# Patient Record
Sex: FEMALE | Race: WHITE | NOT HISPANIC OR LATINO
[De-identification: names, ages, dates, MRNs, and addresses within clinical notes are randomized per-mention and may not be internally consistent; named-entity substitution may affect disease eponyms.]

---
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
## 2020-08-26 ENCOUNTER — APPOINTMENT (OUTPATIENT)
Dept: PEDIATRICS | Facility: CLINIC | Age: 20
End: 2020-08-26

## 2021-01-04 ENCOUNTER — NON-APPOINTMENT (OUTPATIENT)
Age: 21
End: 2021-01-04

## 2021-01-04 ENCOUNTER — APPOINTMENT (OUTPATIENT)
Dept: PEDIATRICS | Facility: CLINIC | Age: 21
End: 2021-01-04
Payer: COMMERCIAL

## 2021-01-04 VITALS
HEIGHT: 68 IN | DIASTOLIC BLOOD PRESSURE: 70 MMHG | BODY MASS INDEX: 23.37 KG/M2 | WEIGHT: 154.2 LBS | HEART RATE: 86 BPM | SYSTOLIC BLOOD PRESSURE: 110 MMHG

## 2021-01-04 DIAGNOSIS — Z23 ENCOUNTER FOR IMMUNIZATION: ICD-10-CM

## 2021-01-04 DIAGNOSIS — F41.9 ANXIETY DISORDER, UNSPECIFIED: ICD-10-CM

## 2021-01-04 DIAGNOSIS — Z00.00 ENCOUNTER FOR GENERAL ADULT MEDICAL EXAMINATION W/OUT ABNORMAL FINDINGS: ICD-10-CM

## 2021-01-04 PROCEDURE — 99173 VISUAL ACUITY SCREEN: CPT | Mod: 59

## 2021-01-04 PROCEDURE — 90471 IMMUNIZATION ADMIN: CPT

## 2021-01-04 PROCEDURE — 96160 PT-FOCUSED HLTH RISK ASSMT: CPT | Mod: 59

## 2021-01-04 PROCEDURE — 99072 ADDL SUPL MATRL&STAF TM PHE: CPT

## 2021-01-04 PROCEDURE — 96127 BRIEF EMOTIONAL/BEHAV ASSMT: CPT

## 2021-01-04 PROCEDURE — 90686 IIV4 VACC NO PRSV 0.5 ML IM: CPT

## 2021-01-04 PROCEDURE — 92551 PURE TONE HEARING TEST AIR: CPT

## 2021-01-04 PROCEDURE — 99395 PREV VISIT EST AGE 18-39: CPT | Mod: 25

## 2021-01-04 RX ORDER — FLUOXETINE HYDROCHLORIDE 20 MG/1
20 CAPSULE ORAL
Refills: 0 | Status: ACTIVE | COMMUNITY

## 2021-01-04 NOTE — HISTORY OF PRESENT ILLNESS
[Up to date] : Up to date [Normal] : normal [Eats meals with family] : eats meals with family [Normal Performance] : normal performance [Eats regular meals including adequate fruits and vegetables] : eats regular meals including adequate fruits and vegetables [Has friends] : has friends [Screen time (except homework) less than 2 hours a day] : screen time (except homework) less than 2 hours a day [No] : No cigarette smoke exposure [Uses safety belts/safety equipment] : uses safety belts/safety equipment  [Has ways to cope with stress] : has ways to cope with stress [Irregular menses] : no irregular menses [Heavy Bleeding] : no heavy bleeding [Painful Cramps] : no painful cramps [Sleep Concerns] : no sleep concerns [Uses electronic nicotine delivery system] : does not use electronic nicotine delivery system [Exposure to electronic nicotine delivery system] : no exposure to electronic nicotine delivery system [Uses tobacco] : does not use tobacco [Exposure to tobacco] : no exposure to tobacco [Uses drugs] : does not use drugs  [Exposure to drugs] : no exposure to drugs [Drinks alcohol] : does not drink alcohol [Exposure to alcohol] : no exposure to alcohol [Yes] : Patient has had sexual intercourse. [HIV Screening Declined] : HIV Screening Declined [Gets depressed, anxious, or irritable/has mood swings] : does not get depressed, anxious, or irritable/has mood swings [de-identified] : Self [FreeTextEntry7] : 20 Year well visit [de-identified] : + condom use [FreeTextEntry1] : college sophomore, virtual \par anxiety- sees Dr Carvajal [id number], taking prozac 20mg daily, no longer in counselling, doing well\par Pt will see OBGYN today for routine care

## 2021-01-04 NOTE — DISCUSSION/SUMMARY
[] : The components of the vaccine(s) to be administered today are listed in the plan of care. The disease(s) for which the vaccine(s) are intended to prevent and the risks have been discussed with the caretaker.  The risks are also included in the appropriate vaccination information statements which have been provided to the patient's caregiver.  The caregiver has given consent to vaccinate. [FreeTextEntry1] : D/W pt well visit, reviewed nutrition/exercise, encourage safety- bike/ski helmet, seatbelt, sunblock, water safety; avoid alcohol/drug/tobacco use; reviewed condom use/chlamydia screens once sexually active; advise routine dental care; reviewed and consented for vaccinations today, advise lipid screen at 18yrs of age; reviewed menses and first Pap/Pelvic with OBGYN at 21yrs old.\par Reviewed PHQ9/Crafft- pt to continue current mental health plan. \par flu vaccine today; declined HPV and Men B vaccine\par

## 2021-01-04 NOTE — PHYSICAL EXAM

## 2025-01-29 ENCOUNTER — OFFICE (OUTPATIENT)
Dept: URBAN - METROPOLITAN AREA CLINIC 77 | Facility: CLINIC | Age: 25
Setting detail: OPHTHALMOLOGY
End: 2025-01-29
Payer: COMMERCIAL

## 2025-01-29 DIAGNOSIS — H02.402: ICD-10-CM

## 2025-01-29 DIAGNOSIS — H53.10: ICD-10-CM

## 2025-01-29 PROCEDURE — 99204 OFFICE O/P NEW MOD 45 MIN: CPT | Performed by: OPHTHALMOLOGY

## 2025-01-29 PROCEDURE — 92133 CPTRZD OPH DX IMG PST SGM ON: CPT | Performed by: OPHTHALMOLOGY

## 2025-01-29 PROCEDURE — 92083 EXTENDED VISUAL FIELD XM: CPT | Performed by: OPHTHALMOLOGY

## 2025-01-29 ASSESSMENT — REFRACTION_CURRENTRX
OS_SPHERE: -2.25
OS_VPRISM_DIRECTION: SV
OS_AXIS: 015
OD_OVR_VA: 20/
OS_OVR_VA: 20/
OD_SPHERE: -2.50
OD_CYLINDER: -0.50
OD_AXIS: 024
OD_VPRISM_DIRECTION: SV
OS_CYLINDER: -0.25

## 2025-01-29 ASSESSMENT — TONOMETRY
OS_IOP_MMHG: 20
OD_IOP_MMHG: 17

## 2025-01-29 ASSESSMENT — KERATOMETRY
OD_K2POWER_DIOPTERS: 42.75
OD_AXISANGLE_DEGREES: 102
OS_AXISANGLE_DEGREES: 083
OS_K1POWER_DIOPTERS: 41.50
OS_K2POWER_DIOPTERS: 43.00
OD_K1POWER_DIOPTERS: 41.50

## 2025-01-29 ASSESSMENT — REFRACTION_AUTOREFRACTION
OS_CYLINDER: -0.75
OS_SPHERE: -2.75
OD_AXIS: 027
OS_AXIS: 076
OD_SPHERE: -3.00
OD_CYLINDER: -0.75

## 2025-01-29 ASSESSMENT — CONFRONTATIONAL VISUAL FIELD TEST (CVF)
OS_FINDINGS: FULL
OD_FINDINGS: FULL

## 2025-01-29 ASSESSMENT — LID POSITION - PTOSIS: OS_PTOSIS: 2+ 3+

## 2025-01-29 ASSESSMENT — VISUAL ACUITY
OD_BCVA: 20/60
OS_BCVA: 20/20

## 2025-02-17 ENCOUNTER — OFFICE (OUTPATIENT)
Dept: URBAN - METROPOLITAN AREA CLINIC 77 | Facility: CLINIC | Age: 25
Setting detail: OPHTHALMOLOGY
End: 2025-02-17
Payer: COMMERCIAL

## 2025-02-17 DIAGNOSIS — H53.10: ICD-10-CM

## 2025-02-17 DIAGNOSIS — H57.02: ICD-10-CM

## 2025-02-17 DIAGNOSIS — H02.402: ICD-10-CM

## 2025-02-17 PROCEDURE — 99214 OFFICE O/P EST MOD 30 MIN: CPT | Performed by: OPHTHALMOLOGY

## 2025-02-17 PROCEDURE — 92285 EXTERNAL OCULAR PHOTOGRAPHY: CPT | Performed by: OPHTHALMOLOGY

## 2025-02-17 ASSESSMENT — VISUAL ACUITY
OD_BCVA: 20/40
OS_BCVA: 20/20

## 2025-02-17 ASSESSMENT — REFRACTION_CURRENTRX
OS_OVR_VA: 20/
OS_SPHERE: -2.25
OD_AXIS: 024
OS_CYLINDER: -0.25
OD_VPRISM_DIRECTION: SV
OD_CYLINDER: -0.50
OS_AXIS: 015
OD_SPHERE: -2.50
OD_OVR_VA: 20/
OS_VPRISM_DIRECTION: SV

## 2025-02-17 ASSESSMENT — KERATOMETRY
OD_K2POWER_DIOPTERS: 42.75
OS_K2POWER_DIOPTERS: 43.00
OS_AXISANGLE_DEGREES: 083
OD_AXISANGLE_DEGREES: 102
OD_K1POWER_DIOPTERS: 41.50
OS_K1POWER_DIOPTERS: 41.50

## 2025-02-17 ASSESSMENT — REFRACTION_AUTOREFRACTION
OS_CYLINDER: -0.75
OD_AXIS: 027
OS_AXIS: 076
OS_SPHERE: -2.75
OD_SPHERE: -3.00
OD_CYLINDER: -0.75

## 2025-02-17 ASSESSMENT — LID POSITION - PTOSIS: OS_PTOSIS: 2+ 3+

## 2025-02-17 ASSESSMENT — CONFRONTATIONAL VISUAL FIELD TEST (CVF)
OS_FINDINGS: FULL
OD_FINDINGS: FULL

## 2025-02-26 ENCOUNTER — TELEHEALTH-HOME (OUTPATIENT)
Facility: LOCATION | Age: 25
Setting detail: OPHTHALMOLOGY
End: 2025-02-26
Payer: COMMERCIAL

## 2025-02-26 DIAGNOSIS — H02.402: ICD-10-CM

## 2025-02-26 PROBLEM — H57.02 ANISOCORIA IRREGULAR PUPIL ; LEFT EYE: Status: ACTIVE | Noted: 2025-02-17

## 2025-02-26 PROCEDURE — 98013 SYNCH AUDIO-ONLY EST LOW 20: CPT | Mod: 93 | Performed by: OPHTHALMOLOGY

## 2025-02-26 ASSESSMENT — REFRACTION_AUTOREFRACTION
OS_SPHERE: -2.75
OD_SPHERE: -3.00
OS_CYLINDER: -0.75
OS_AXIS: 076
OD_CYLINDER: -0.75
OD_AXIS: 027

## 2025-02-26 ASSESSMENT — KERATOMETRY
OD_K1POWER_DIOPTERS: 41.50
OS_AXISANGLE_DEGREES: 083
OD_AXISANGLE_DEGREES: 102
OS_K1POWER_DIOPTERS: 41.50
OD_K2POWER_DIOPTERS: 42.75
OS_K2POWER_DIOPTERS: 43.00

## 2025-02-26 ASSESSMENT — VISUAL ACUITY
OD_BCVA: 20/40
OS_BCVA: 20/20

## 2025-03-06 ENCOUNTER — OFFICE (OUTPATIENT)
Dept: URBAN - METROPOLITAN AREA CLINIC 100 | Facility: CLINIC | Age: 25
Setting detail: OPHTHALMOLOGY
End: 2025-03-06
Payer: COMMERCIAL

## 2025-03-06 DIAGNOSIS — H02.402: ICD-10-CM

## 2025-03-06 DIAGNOSIS — H53.40: ICD-10-CM

## 2025-03-06 DIAGNOSIS — L71.0: ICD-10-CM

## 2025-03-06 PROCEDURE — 92012 INTRM OPH EXAM EST PATIENT: CPT | Performed by: OPHTHALMOLOGY

## 2025-03-06 PROCEDURE — 92081 LIMITED VISUAL FIELD XM: CPT | Performed by: OPHTHALMOLOGY

## 2025-03-06 PROCEDURE — 92285 EXTERNAL OCULAR PHOTOGRAPHY: CPT | Performed by: OPHTHALMOLOGY

## 2025-03-06 ASSESSMENT — REFRACTION_AUTOREFRACTION
OS_CYLINDER: -0.75
OD_CYLINDER: -0.75
OS_SPHERE: -2.75
OD_SPHERE: -3.00
OS_AXIS: 076
OD_AXIS: 027

## 2025-03-06 ASSESSMENT — VISUAL ACUITY
OD_BCVA: 20/70
OS_BCVA: 20/20

## 2025-03-06 ASSESSMENT — CONFRONTATIONAL VISUAL FIELD TEST (CVF)
OS_FINDINGS: FULL
OD_FINDINGS: FULL

## 2025-03-06 ASSESSMENT — KERATOMETRY
OD_AXISANGLE_DEGREES: 102
OD_K1POWER_DIOPTERS: 41.50
OS_K1POWER_DIOPTERS: 41.50
OD_K2POWER_DIOPTERS: 42.75
OS_AXISANGLE_DEGREES: 083
OS_K2POWER_DIOPTERS: 43.00

## 2025-03-06 ASSESSMENT — LID POSITION - PTOSIS: OS_PTOSIS: LUL

## 2025-03-06 ASSESSMENT — LID POSITION - COMMENTS: OS_COMMENTS: EYELID EVERTED
